# Patient Record
Sex: MALE | Race: WHITE | NOT HISPANIC OR LATINO | Employment: OTHER | URBAN - METROPOLITAN AREA
[De-identification: names, ages, dates, MRNs, and addresses within clinical notes are randomized per-mention and may not be internally consistent; named-entity substitution may affect disease eponyms.]

---

## 2021-09-17 PROCEDURE — 88305 TISSUE EXAM BY PATHOLOGIST: CPT | Mod: 26 | Performed by: DERMATOLOGY

## 2021-09-17 PROCEDURE — 88305 TISSUE EXAM BY PATHOLOGIST: CPT | Mod: TC,ORL | Performed by: DERMATOLOGY

## 2021-09-20 ENCOUNTER — LAB REQUISITION (OUTPATIENT)
Dept: LAB | Facility: CLINIC | Age: 71
End: 2021-09-20
Payer: MEDICARE

## 2021-09-22 LAB
PATH REPORT.COMMENTS IMP SPEC: NORMAL
PATH REPORT.COMMENTS IMP SPEC: NORMAL
PATH REPORT.FINAL DX SPEC: NORMAL
PATH REPORT.GROSS SPEC: NORMAL
PATH REPORT.MICROSCOPIC SPEC OTHER STN: NORMAL
PATH REPORT.RELEVANT HX SPEC: NORMAL

## 2023-08-14 ENCOUNTER — HOSPITAL ENCOUNTER (OUTPATIENT)
Facility: CLINIC | Age: 73
Setting detail: OBSERVATION
Discharge: HOME | End: 2023-08-15
Attending: EMERGENCY MEDICINE | Admitting: INTERNAL MEDICINE
Payer: MEDICARE

## 2023-08-14 DIAGNOSIS — I48.91 ATRIAL FIBRILLATION WITH RAPID VENTRICULAR RESPONSE (H): ICD-10-CM

## 2023-08-14 LAB
ALBUMIN SERPL BCG-MCNC: 4 G/DL (ref 3.5–5.2)
ALBUMIN UR-MCNC: NEGATIVE MG/DL
ALP SERPL-CCNC: 53 U/L (ref 40–129)
ALT SERPL W P-5'-P-CCNC: 16 U/L (ref 0–70)
ANION GAP SERPL CALCULATED.3IONS-SCNC: 11 MMOL/L (ref 7–15)
APPEARANCE UR: CLEAR
APTT PPP: 31 SECONDS (ref 22–38)
AST SERPL W P-5'-P-CCNC: 26 U/L (ref 0–45)
BILIRUB DIRECT SERPL-MCNC: NORMAL MG/DL
BILIRUB SERPL-MCNC: 1.2 MG/DL
BILIRUB UR QL STRIP: NEGATIVE
BUN SERPL-MCNC: 15.5 MG/DL (ref 8–23)
CALCIUM SERPL-MCNC: 8.8 MG/DL (ref 8.8–10.2)
CHLORIDE SERPL-SCNC: 106 MMOL/L (ref 98–107)
COLOR UR AUTO: YELLOW
CREAT SERPL-MCNC: 1.05 MG/DL (ref 0.67–1.17)
DEPRECATED HCO3 PLAS-SCNC: 24 MMOL/L (ref 22–29)
ERYTHROCYTE [DISTWIDTH] IN BLOOD BY AUTOMATED COUNT: 13 % (ref 10–15)
ERYTHROCYTE [DISTWIDTH] IN BLOOD BY AUTOMATED COUNT: 13.2 % (ref 10–15)
GFR SERPL CREATININE-BSD FRML MDRD: 74 ML/MIN/1.73M2
GLUCOSE SERPL-MCNC: 113 MG/DL (ref 70–99)
GLUCOSE UR STRIP-MCNC: NEGATIVE MG/DL
HBA1C MFR BLD: 5.8 %
HCT VFR BLD AUTO: 46.3 % (ref 40–53)
HCT VFR BLD AUTO: 48.9 % (ref 40–53)
HGB BLD-MCNC: 15.7 G/DL (ref 13.3–17.7)
HGB BLD-MCNC: 16.9 G/DL (ref 13.3–17.7)
HGB UR QL STRIP: NEGATIVE
HOLD SPECIMEN: NORMAL
INR PPP: 1.09 (ref 0.85–1.15)
KETONES UR STRIP-MCNC: NEGATIVE MG/DL
LEUKOCYTE ESTERASE UR QL STRIP: NEGATIVE
MAGNESIUM SERPL-MCNC: 1.8 MG/DL (ref 1.7–2.3)
MCH RBC QN AUTO: 32.8 PG (ref 26.5–33)
MCH RBC QN AUTO: 33.2 PG (ref 26.5–33)
MCHC RBC AUTO-ENTMCNC: 33.9 G/DL (ref 31.5–36.5)
MCHC RBC AUTO-ENTMCNC: 34.6 G/DL (ref 31.5–36.5)
MCV RBC AUTO: 96 FL (ref 78–100)
MCV RBC AUTO: 97 FL (ref 78–100)
MUCOUS THREADS #/AREA URNS LPF: PRESENT /LPF
NITRATE UR QL: NEGATIVE
PH UR STRIP: 6 [PH] (ref 5–7)
PLATELET # BLD AUTO: 211 10E3/UL (ref 150–450)
PLATELET # BLD AUTO: 216 10E3/UL (ref 150–450)
POTASSIUM SERPL-SCNC: 4.3 MMOL/L (ref 3.4–5.3)
PROT SERPL-MCNC: 6.6 G/DL (ref 6.4–8.3)
RBC # BLD AUTO: 4.78 10E6/UL (ref 4.4–5.9)
RBC # BLD AUTO: 5.09 10E6/UL (ref 4.4–5.9)
RBC URINE: 3 /HPF
SODIUM SERPL-SCNC: 141 MMOL/L (ref 136–145)
SP GR UR STRIP: 1.02 (ref 1–1.03)
TROPONIN T SERPL HS-MCNC: 34 NG/L
TROPONIN T SERPL HS-MCNC: 39 NG/L
TROPONIN T SERPL HS-MCNC: 49 NG/L
TSH SERPL DL<=0.005 MIU/L-ACNC: 2.27 UIU/ML (ref 0.3–4.2)
UFH PPP CHRO-ACNC: 0.13 IU/ML
UROBILINOGEN UR STRIP-MCNC: NORMAL MG/DL
WBC # BLD AUTO: 13.8 10E3/UL (ref 4–11)
WBC # BLD AUTO: 14.2 10E3/UL (ref 4–11)
WBC URINE: <1 /HPF

## 2023-08-14 PROCEDURE — 96365 THER/PROPH/DIAG IV INF INIT: CPT

## 2023-08-14 PROCEDURE — 99292 CRITICAL CARE ADDL 30 MIN: CPT

## 2023-08-14 PROCEDURE — 99291 CRITICAL CARE FIRST HOUR: CPT | Mod: 25

## 2023-08-14 PROCEDURE — 99222 1ST HOSP IP/OBS MODERATE 55: CPT | Performed by: INTERNAL MEDICINE

## 2023-08-14 PROCEDURE — 93005 ELECTROCARDIOGRAM TRACING: CPT

## 2023-08-14 PROCEDURE — 93005 ELECTROCARDIOGRAM TRACING: CPT | Mod: 76

## 2023-08-14 PROCEDURE — 85730 THROMBOPLASTIN TIME PARTIAL: CPT | Performed by: EMERGENCY MEDICINE

## 2023-08-14 PROCEDURE — 96376 TX/PRO/DX INJ SAME DRUG ADON: CPT

## 2023-08-14 PROCEDURE — 120N000001 HC R&B MED SURG/OB

## 2023-08-14 PROCEDURE — 96375 TX/PRO/DX INJ NEW DRUG ADDON: CPT

## 2023-08-14 PROCEDURE — 99223 1ST HOSP IP/OBS HIGH 75: CPT | Mod: AI | Performed by: HOSPITALIST

## 2023-08-14 PROCEDURE — 82310 ASSAY OF CALCIUM: CPT | Performed by: EMERGENCY MEDICINE

## 2023-08-14 PROCEDURE — 36415 COLL VENOUS BLD VENIPUNCTURE: CPT | Performed by: HOSPITALIST

## 2023-08-14 PROCEDURE — 85027 COMPLETE CBC AUTOMATED: CPT | Performed by: HOSPITALIST

## 2023-08-14 PROCEDURE — 36415 COLL VENOUS BLD VENIPUNCTURE: CPT | Performed by: EMERGENCY MEDICINE

## 2023-08-14 PROCEDURE — 81001 URINALYSIS AUTO W/SCOPE: CPT | Performed by: EMERGENCY MEDICINE

## 2023-08-14 PROCEDURE — 85610 PROTHROMBIN TIME: CPT | Performed by: EMERGENCY MEDICINE

## 2023-08-14 PROCEDURE — 85027 COMPLETE CBC AUTOMATED: CPT | Performed by: EMERGENCY MEDICINE

## 2023-08-14 PROCEDURE — 250N000011 HC RX IP 250 OP 636: Mod: JZ | Performed by: HOSPITALIST

## 2023-08-14 PROCEDURE — 84484 ASSAY OF TROPONIN QUANT: CPT | Performed by: HOSPITALIST

## 2023-08-14 PROCEDURE — 250N000013 HC RX MED GY IP 250 OP 250 PS 637: Performed by: INTERNAL MEDICINE

## 2023-08-14 PROCEDURE — 84443 ASSAY THYROID STIM HORMONE: CPT | Performed by: EMERGENCY MEDICINE

## 2023-08-14 PROCEDURE — 250N000011 HC RX IP 250 OP 636: Performed by: EMERGENCY MEDICINE

## 2023-08-14 PROCEDURE — 83735 ASSAY OF MAGNESIUM: CPT | Performed by: EMERGENCY MEDICINE

## 2023-08-14 PROCEDURE — 96366 THER/PROPH/DIAG IV INF ADDON: CPT

## 2023-08-14 PROCEDURE — 83036 HEMOGLOBIN GLYCOSYLATED A1C: CPT | Performed by: HOSPITALIST

## 2023-08-14 PROCEDURE — 85520 HEPARIN ASSAY: CPT | Performed by: HOSPITALIST

## 2023-08-14 PROCEDURE — 84484 ASSAY OF TROPONIN QUANT: CPT | Performed by: EMERGENCY MEDICINE

## 2023-08-14 RX ORDER — METOPROLOL TARTRATE 25 MG/1
25 TABLET, FILM COATED ORAL 2 TIMES DAILY
Status: DISCONTINUED | OUTPATIENT
Start: 2023-08-15 | End: 2023-08-15 | Stop reason: HOSPADM

## 2023-08-14 RX ORDER — ACETAMINOPHEN 325 MG/1
650 TABLET ORAL EVERY 6 HOURS PRN
Status: DISCONTINUED | OUTPATIENT
Start: 2023-08-14 | End: 2023-08-15 | Stop reason: HOSPADM

## 2023-08-14 RX ORDER — ONDANSETRON 2 MG/ML
4 INJECTION INTRAMUSCULAR; INTRAVENOUS EVERY 6 HOURS PRN
Status: DISCONTINUED | OUTPATIENT
Start: 2023-08-14 | End: 2023-08-15 | Stop reason: HOSPADM

## 2023-08-14 RX ORDER — METOPROLOL TARTRATE 50 MG
50 TABLET ORAL 2 TIMES DAILY
Status: DISCONTINUED | OUTPATIENT
Start: 2023-08-14 | End: 2023-08-14

## 2023-08-14 RX ORDER — DILTIAZEM HCL/D5W 125 MG/125
5-15 PLASTIC BAG, INJECTION (ML) INTRAVENOUS CONTINUOUS
Status: DISCONTINUED | OUTPATIENT
Start: 2023-08-14 | End: 2023-08-14

## 2023-08-14 RX ORDER — ACETAMINOPHEN 650 MG/1
650 SUPPOSITORY RECTAL EVERY 6 HOURS PRN
Status: DISCONTINUED | OUTPATIENT
Start: 2023-08-14 | End: 2023-08-15 | Stop reason: HOSPADM

## 2023-08-14 RX ORDER — ONDANSETRON 4 MG/1
4 TABLET, ORALLY DISINTEGRATING ORAL EVERY 6 HOURS PRN
Status: DISCONTINUED | OUTPATIENT
Start: 2023-08-14 | End: 2023-08-15 | Stop reason: HOSPADM

## 2023-08-14 RX ORDER — HEPARIN SODIUM 10000 [USP'U]/100ML
0-5000 INJECTION, SOLUTION INTRAVENOUS CONTINUOUS
Status: DISCONTINUED | OUTPATIENT
Start: 2023-08-14 | End: 2023-08-14

## 2023-08-14 RX ORDER — LIDOCAINE 40 MG/G
CREAM TOPICAL
Status: DISCONTINUED | OUTPATIENT
Start: 2023-08-14 | End: 2023-08-15 | Stop reason: HOSPADM

## 2023-08-14 RX ORDER — NITROGLYCERIN 0.4 MG/1
0.4 TABLET SUBLINGUAL EVERY 5 MIN PRN
Status: DISCONTINUED | OUTPATIENT
Start: 2023-08-14 | End: 2023-08-15 | Stop reason: HOSPADM

## 2023-08-14 RX ORDER — DILTIAZEM HYDROCHLORIDE 5 MG/ML
20 INJECTION INTRAVENOUS ONCE
Status: COMPLETED | OUTPATIENT
Start: 2023-08-14 | End: 2023-08-14

## 2023-08-14 RX ORDER — HEPARIN SODIUM 10000 [USP'U]/100ML
0-5000 INJECTION, SOLUTION INTRAVENOUS CONTINUOUS
Status: DISCONTINUED | OUTPATIENT
Start: 2023-08-14 | End: 2023-08-15

## 2023-08-14 RX ADMIN — HEPARIN SODIUM 1200 UNITS/HR: 10000 INJECTION, SOLUTION INTRAVENOUS at 14:15

## 2023-08-14 RX ADMIN — METOPROLOL TARTRATE 50 MG: 50 TABLET, FILM COATED ORAL at 20:21

## 2023-08-14 RX ADMIN — HEPARIN SODIUM 1200 UNITS/HR: 10000 INJECTION, SOLUTION INTRAVENOUS at 18:26

## 2023-08-14 RX ADMIN — DILTIAZEM HYDROCHLORIDE 20 MG: 5 INJECTION INTRAVENOUS at 13:11

## 2023-08-14 RX ADMIN — Medication 5 MG/HR: at 13:57

## 2023-08-14 ASSESSMENT — ACTIVITIES OF DAILY LIVING (ADL)
ADLS_ACUITY_SCORE: 35
ADLS_ACUITY_SCORE: 35
ADLS_ACUITY_SCORE: 20

## 2023-08-14 NOTE — ED TRIAGE NOTES
Pt arrives via EMS from [facility] d/t afib RVR. Pt went to [facility] for dyspnea since Sat. Denies hx afib. HR ranging 150-160s, 140-160 SBP. 324 mg ASA and 1 nitroglycerin which took away mild CP. Lungs clear. ABC intact.     Of note, Pt had bee sting on Friday and took OCT meds to help with itching.

## 2023-08-14 NOTE — PHARMACY-ADMISSION MEDICATION HISTORY
Pharmacist Admission Medication History    Admission medication history is complete. The information provided in this note is only as accurate as the sources available at the time of the update.    Medication reconciliation/reorder completed by provider prior to medication history? No    Information Source(s): Patient via in-person    Changes made to PTA medication list:  Added: None  Deleted: None  Changed: None    Medication Affordability:       Allergies reviewed with patient and updates made in EHR: yes    Medication History Completed By: Mahendra De La O RPH 8/14/2023 1:30 PM    Prior to Admission medications    Not on File

## 2023-08-14 NOTE — H&P
[facility]    History and Physical - Hospitalist Service       Date of Admission:  8/14/2023    Assessment & Plan    Ryan Metcalf is a 73 year old male w/PMH basal cell carcinoma, impaired fasting glucose who presents from home with palpitations and found to be in afib with RVR    Afib with RVR  palpitations   -presents with palpitations since Saturday with mild dyspnea, evaluated at urgent care and noted to be in afib with 's. Transferred to ED and started on cardizem and heparin drip and has now flipped back to NSR and symptoms have resolved.  -TSH, K+ WNL. Mag 1.8, Cr ok  -start low dose metoprolol with hold parameters as HR now in low 60's, wean off cardizem drip  -cont heparin drip but if no need for cardioversion may not even need on discharge as his CHADSVASC2 is only 1 point   -check ECHO, monitor on tele and consult cardiology  -some reports of snoring, could consider outpt sleep study    Elevated troponin  -minimally elevated at 34 due to afib, no evidence of ACS   -trend trops, monitor on tele    Recent bee sting w/allergic response  Leukocytosis  -bee sting last Thursday, hives now resolving and asymptomatic  -mild leukocytosis, likely stress response. Monitor    Hx basal cell carcinoma, impaired fasting glucose, alcohol use  -drinks appx 1 drink daily, doubt contributing to above  -check A1C    Diet:  cardiac diet  DVT Prophylaxis: heparin drip  Rollins Catheter: Not present  Lines: None     Cardiac Monitoring: None  Code Status:  full code      Disposition Plan   Likely tomorrow          Josiah Jhaveri DO  Hospitalist Service  [facility]  Securely message with [employer] (more info)  Text page via [employer] Paging/Directory     ______________________________________________________________________    Chief Complaint   palpitations    History of Present Illness   Ryan Metcalf is a 73 year old male w/PMH basal cell carcinoma, impaired fasting glucose who  presents from home with palpitations. He notes being stung by a bee last Thursday with resultant severe hives over chest, abdomen and into his groin and back. Took Allegra and this seemed to resolve without incident. However starting Saturday started to develop some occasional palpitations with mild weakness and occasional difficulty with deep breathing. Denies any sob, CP, fever, chills or other complaints. Symptoms were persistent and came back gain today so went to urgent care and was found to have HR in the 150's and was transferred to ED.    In ED was started on cardizem drip and appears he has converted to NSR. Was placed on heparin drip as well. Denies any hx of cardiac issues, never had a stress test or ECHO. TSH and electrolytes ok, troponin minimally elevated at 36. EKG with afb. HR currently 60's and no further palpitations.    Past Medical History    Basal cell carcinoma  Impaired fasting glucose    Past Surgical History   L knee arthroscopy    Prior to Admission Medications   None        Review of Systems    The 10 point Review of Systems is negative other than noted in the HPI or here.    Social History   Drinks appx 1 alcoholic beverage every 24-48 hours  Denies tobacco use     Family History     No significant family history reported to me      Allergies   No Known Allergies     Physical Exam   Vital Signs: Temp: 98.3  F (36.8  C) Temp src: Temporal BP: 110/75 Pulse: 65   Resp: 20 SpO2: 98 % O2 Device: None (Room air)    Weight: 219 lbs 5.72 oz    Constitutional: awake, alert, and cooperative  Eyes: pupils equal, round and reactive to light and conjunctiva normal  ENT: normocepalic, without obvious abnormality, atramatic  Respiratory: no increased work of breathing, good air exchange, and clear to auscultation  Cardiovascular: regular rate and rhythm, no murmur noted, and no edema  GI: normal bowel sounds, soft, and non-distended  Skin: no bruising or bleeding, mild resolving erythema to L anterior  chest wall  Neurologic: alert, oriented, no focal deficits    45 MINUTES SPENT BY ME on the date of service doing chart review, history, exam, documentation & further activities per the note.      Data   ------------------------- PAST 24 HR DATA REVIEWED -----------------------------------------------    I have personally reviewed the following data over the past 24 hrs:    14.2 (H)  \   15.7   / 216     141 106 15.5 /  113 (H)   4.3 24 1.05 \     ALT: 16 AST: 26 AP: 53 TBILI: 1.2   ALB: 4.0 TOT PROTEIN: 6.6 LIPASE: N/A     Trop: 34 (H) BNP: N/A     TSH: 2.27 T4: N/A A1C: N/A     INR:  1.09 PTT:  31   D-dimer:  N/A Fibrinogen:  N/A       Imaging results reviewed over the past 24 hrs:   No results found for this or any previous visit (from the past 24 hour(s)).

## 2023-08-14 NOTE — ED PROVIDER NOTES
History     Chief Complaint:  Tachycardia       HPI   Ryan Metcalf is a 73 year old male who presents with palpitations. The patient reports an onset of palpitations and shortness of breath with exertion two days ago that later resolved. He began having palpitations again yesterday evening, and noted feeling short of breath with exertion again along with chest pressure with deep breathing. He has never had this before and had been feeling at baseline prior besides being stung by a bee a 3 days ago and receiving Allegra. No recent fever, cough, or congestion. No unexpected weight loss or recent bleeding issues. He presents here in atrial fibrillation with RVR from [facility].     Independent Historian:   The patient's wife is present and provides additional history in regards to the progression of his symptoms over the last 3 to 4 days as well as the bee sting over the chest.    History taken from EMS.  He received aspirin and nitroglycerin prehospital.    Review of External Notes:   Office visit reviewed from earlier today when the patient was found to be in atrial fibrillation.      Medications:    None     Past Medical History:    Tubular adenoma of colon  Melanoma of skin    Past Surgical History:    Left knee arthroplasty    Tonsillectomy  Melanoma excision  Bilateral blepharoplasty   Colonoscopy with snare polypectomy   Back surgery     Physical Exam   Patient Vitals for the past 24 hrs:   BP Temp Temp src Pulse Resp SpO2 Weight   08/14/23 1418 -- -- -- 65 20 98 % --   08/14/23 1415 110/75 -- -- 65 16 98 % --   08/14/23 1411 -- -- -- 64 20 97 % --   08/14/23 1406 -- -- -- 61 17 97 % --   08/14/23 1405 -- -- -- (!) 158 21 99 % --   08/14/23 1403 -- -- -- 86 18 97 % --   08/14/23 1402 108/82 -- -- (!) 123 21 97 % --   08/14/23 1352 (!) 113/96 -- -- (!) 129 12 100 % --   08/14/23 1347 -- -- -- (!) 149 17 100 % --   08/14/23 1337 99/72 -- -- 66 23 98 % --   08/14/23 1325 -- -- -- (!) 125 30 100  % --   08/14/23 1320 -- -- -- 89 -- -- --   08/14/23 1315 -- -- -- 98 17 99 % --   08/14/23 1310 96/72 -- -- (!) 181 22 100 % --   08/14/23 1302 -- -- -- (!) 170 20 100 % --   08/14/23 1300 111/76 -- -- (!) 181 17 100 % --   08/14/23 1247 99/85 -- -- (!) 176 (!) 8 100 % --   08/14/23 1240 -- -- -- (!) 142 19 100 % --   08/14/23 1232 (!) 104/95 -- -- (!) 146 14 100 % --   08/14/23 1230 -- 98.3  F (36.8  C) Temporal (!) 168 -- -- --   08/14/23 1229 -- -- -- -- -- -- 99.5 kg (219 lb 5.7 oz)   08/14/23 1226 (!) 114/94 -- Temporal -- 20 100 % --        Physical Exam  Constitutional:       General: He is not in acute distress.     Appearance: Normal appearance. He is not toxic-appearing.   HENT:      Head: Atraumatic.      Right Ear: External ear normal.      Left Ear: External ear normal.      Nose: Nose normal.      Mouth/Throat:      Pharynx: Oropharynx is clear.   Eyes:      General: No scleral icterus.     Extraocular Movements: Extraocular movements intact.      Conjunctiva/sclera: Conjunctivae normal.      Pupils: Pupils are equal, round, and reactive to light.   Cardiovascular:      Rate and Rhythm: Tachycardia present. Rhythm irregular.      Heart sounds: Normal heart sounds.   Pulmonary:      Effort: Pulmonary effort is normal. No respiratory distress.      Breath sounds: Normal breath sounds.   Abdominal:      Palpations: Abdomen is soft.      Tenderness: There is no abdominal tenderness.   Musculoskeletal:         General: No deformity.      Cervical back: Neck supple.      Right lower leg: No edema.      Left lower leg: No edema.   Skin:     General: Skin is warm.      Capillary Refill: Capillary refill takes less than 2 seconds.      Comments: No rash over the chest.   Neurological:      General: No focal deficit present.      Mental Status: He is alert and oriented to person, place, and time.   Psychiatric:         Mood and Affect: Mood normal.         Behavior: Behavior normal.           Emergency  Department Course   ECG 1  ECG taken at 1225, ECG read at 1233  Atrial fibrillation with RVR; ST&T wave abnormality, consider lateral ischemia   No prior ECG for comparison.  Rate 171 bpm. WY interval * ms. QRS duration 84 ms. QT/QTc 282/475 ms. P-R-T axes * -20 185.     ECG 2  ECG taken at 1416, ECG read at 1420  Normal sinus rhythm; nonspecific ST abnormality   Sinus rhythm replaces atrial fibrillation as compared to prior today.  Rate 6 bpm. WY interval 130 ms. QRS duration 94 ms. QT/QTc 382/400 ms. P-R-T axes 35 -18 16.      Laboratory:  Labs Ordered and Resulted from Time of ED Arrival to Time of ED Departure   TROPONIN T, HIGH SENSITIVITY - Abnormal       Result Value    Troponin T, High Sensitivity 34 (*)    CBC WITH PLATELETS - Abnormal    WBC Count 14.2 (*)     RBC Count 4.78      Hemoglobin 15.7      Hematocrit 46.3      MCV 97      MCH 32.8      MCHC 33.9      RDW 13.0      Platelet Count 216     BASIC METABOLIC PANEL - Abnormal    Sodium 141      Potassium 4.3      Chloride 106      Carbon Dioxide (CO2) 24      Anion Gap 11      Urea Nitrogen 15.5      Creatinine 1.05      Calcium 8.8      Glucose 113 (*)     GFR Estimate 74     INR - Normal    INR 1.09     PARTIAL THROMBOPLASTIN TIME - Normal    aPTT 31     MAGNESIUM - Normal    Magnesium 1.8     TSH WITH FREE T4 REFLEX - Normal    TSH 2.27     HEPATIC FUNCTION PANEL    Protein Total 6.6      Albumin 4.0      Bilirubin Total 1.2      Alkaline Phosphatase 53      AST 26      ALT 16      Bilirubin Direct       ROUTINE UA WITH MICROSCOPIC REFLEX TO CULTURE      Emergency Department Course & Assessments:  Interventions:  Medications   diltiazem (CARDIZEM) 125 mg in dextrose 5 % 125 mL infusion (0 mg/hr Intravenous Stopped 8/14/23 1418)   heparin 25,000 units in 0.45% NaCl 250 mL ANTICOAGULANT infusion (0 Units/hr Intravenous Stopped 8/14/23 1422)   diltiazem (CARDIZEM) injection 20 mg (20 mg Intravenous $Given 8/14/23 1311)   heparin loading dose for LOW  INTENSITY TREATMENT * Give BEFORE starting heparin infusion (5,950 Units Intravenous $Given 8/14/23 1414)      Assessments:  1225 I obtained history and examined the patient as noted above.     Consultations/Discussion of Management or Tests:  1416 I spoke with Priti from hospitalist service regarding the patient's presentation and workup. Care of patient accepted on behalf of Dr. Jhaveri.    Disposition:  The patient was admitted to the hospital under the care of Dr. Jhaveri.     Impression & Plan      Medical Decision Making:  This patient is a 73-year-old man who presents with new onset rapid atrial fibrillation.  He is otherwise generally healthy.  He received diltiazem bolus.  He had some improvement in his rate but continued in A-fib.  He was started on a diltiazem drip.  Cardioversion at this point has been deferred given that his symptoms have been off and on for over 2 days.  He was started on a heparin drip as well.    The patient has had very close frequent repeat evaluations.  He is being admitted to the hospital service for further work-up.  He did convert spontaneously to normal sinus rhythm.  He feels improved overall.    Critical Care time was 45 minutes for this patient excluding procedures.     Diagnosis:    ICD-10-CM    1. Atrial fibrillation with rapid ventricular response (H)  I48.91            Scribe Disclosure:  I, Roula Bruno, am serving as a scribe at 12:33 PM on 8/14/2023 to document services personally performed by Gopi Viera MD based on my observations and the provider's statements to me.     8/14/2023   Gopi Viera MD McRoberts, Sean Edward, MD  08/14/23 0246

## 2023-08-14 NOTE — CONSULTS
[facility]    Cardiology Consultation    Assessment & Plan   Ryan Metcalf is a 73 year old male who was admitted on 8/14/2023.     1.  Newly diagnosed Paroxysmal Atrial Fibrillation CHADSVASC = 1 based upon age 73  2.  Sp  TKR    He will require no anticoagulation at this time given CHADSVaSc 1. We will plan  to screen for cardiac structural abnormalities with TTE and exclude hyperthyroidism.  Beta blocker therapy is a safe and oftentimes effective means to  reduce symptomatic palpitations. Limiting alcohol, weight loss and treatment  of  sleep apnea are  nonpharmacologic lifestyle interventions that reduce episodes of paroxysmal  atrial fibrillation    Plan  Agree on holding anticoagulation for now unless LV dysfunction on TTE  Increase metoprolol to 50 bid  TTE  Mediterranean style weight loss diet  Limit alcohol consumption  Screen for hyperthyroidism /sleep apnea      Ponce Landrum MD, MD    History of present illness.     Ryan Metcalf, a 73-year-old man with a history of osteoarthritis and (status post left total knee replacement ), back surgery, and melanoma ( sp excision) was evaluated in consultation request of  for newly diagnosed paroxysmal atrial fibrillation    Since Saturday evening, 2 days prior to admission, the patient's noted intermittent palpitations along with mild dyspnea.  He denies chest arm neck jaw or back discomfort, structural heart disease, diabetes mellitus, hypertension, vascular disease, hypothyroidism, or known valve disease.  He and his wife usually winter in [place], and are planning to move to [facility].  He drinks about 1 alcoholic drink per day and denies any history of alcohol abuse.  Because of prolonged palpitations this morning, he came to emergency room where he underwent an ECG confirming atrial fibrillation with a rapid ventricular response rate.  The patient was placed on intervenous diltiazem, but converted back to a sinus  rhythm and has been switched to oral metoprolol 12.5 twice daily by his managing doctors.  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[employer]  bikes or walks daily  [place] in [place]    plans  a move to [facility], leaving this weekend        Review of systems  A 12 point review of systems was performed outside the issues mentioned HPI there were no other complaint      Physical Exam   Temp: 98.5  F (36.9  C) Temp src: Oral BP: 127/80 Pulse: 71   Resp: 18 SpO2: 95 % O2 Device: None (Room air)    Vitals:    08/14/23 1229 08/14/23 1626   Weight: 99.5 kg (219 lb 5.7 oz) 97.7 kg (215 lb 6.4 oz)     Vital Signs with Ranges  Temp:  [98.3  F (36.8  C)-98.5  F (36.9  C)] 98.5  F (36.9  C)  Pulse:  [] 71  Resp:  [8-30] 18  BP: ()/(70-96) 127/80  SpO2:  [95 %-100 %] 95 %  No intake/output data recorded.    Constitutional: Pleasant, intelligent awake, alert, cooperative, no apparent distress  HEENT: Pupils equal round reactive to light as normal size JVD 6 cm  Lungs: Clear to auscultation bilaterally, no crackles or wheezing  Cardiovascular: Regular rate and rhythm, normal S1 and S2, and no murmur noted  Pulses: Carotid, radial, brachial, femoral, popliteal, dorsalis pedis, posterior tibial pulses all normal and symmetrical no bruits  Abdomen: Normal bowel sounds, soft, non-distended, non-tender moderate obesity no mass or tenderness  Skin: No rashes, no cyanosis, no edema   Neurologic : Cranial nerves II through XII are intact strength equal symmetrical displays normal insight and judgment      Medications    dilTIAZem Stopped (08/14/23 1418)    heparin      Reason anticoagulant  not prescribed for atrial fibrillation      - MEDICATION INSTRUCTIONS -        metoprolol tartrate  12.5 mg Oral BID    sodium chloride (PF)  3 mL Intracatheter Q8H    sodium chloride (PF)  3 mL Intracatheter Q8H       Data   I personally reviewed  admission the EKG tracing showing Atrial fibrillation NSST changes rate 171    .  Repeat ECG shows NSR normal axis and normal intervals  Recent Labs   Lab 08/14/23  1651 08/14/23  1230   WBC 13.8* 14.2*   HGB 16.9 15.7   MCV 96 97    216   INR  --  1.09   NA  --  141   POTASSIUM  --  4.3   CHLORIDE  --  106   CO2  --  24   BUN  --  15.5   CR  --  1.05   ANIONGAP  --  11     No results found for this or any previous visit (from the past 24 hour(s)).

## 2023-08-14 NOTE — PLAN OF CARE
"/80 (BP Location: Right arm)   Pulse 71   Temp 98.5  F (36.9  C) (Oral)   Resp 18   Ht 1.778 m (5' 10\")   Wt 97.7 kg (215 lb 6.4 oz)   SpO2 95%   BMI 30.91 kg/m      Neuro: Alert and Oriented x4  Cardiac: Tele SR  Lungs: WDL  GI: WDL  : WDL  Pain: Denies  IV: Saline lock right hand, Heparin infusing @1200  Meds: Heparin  Labs/tests: UA to be collected  Diet: Cardiac Diet  Activity: SBA  Misc: I/Os, Cardialogy consult  Plan: Currently resting in bed, able to make need known.    "

## 2023-08-14 NOTE — ED NOTES
[facility]  ED Nurse Handoff Report    ED Chief complaint: Tachycardia  . ED Diagnosis:   Final diagnoses:   Atrial fibrillation with rapid ventricular response (H)       Allergies: No Known Allergies    Code Status: Full Code    Activity level - Baseline/Home:  independent.  Activity Level - Current:   standby.   Lift room needed: No.   Bariatric: No   Needed: No   Isolation: No.   Infection: Not Applicable.     Respiratory status: Room air    Vital Signs (within 30 minutes):   Vitals:    08/14/23 1449 08/14/23 1504 08/14/23 1515 08/14/23 1519   BP: 120/74 119/77 120/75    Pulse: 70 66 73    Resp: 19  20    Temp:       TempSrc:       SpO2: 97% 98% 97% 96%   Weight:           Cardiac Rhythm:  ,   Cardiac  Cardiac Rhythm: Atrial fibrillation (initally afib RVR, after 20 mg diltiazem went to afib 90s. Denies CP.)  Pain level:    Patient confused: No.   Patient Falls Risk: nonskid shoes/slippers when out of bed, patient and family education, assistive device/personal items within reach, and mobility aid in reach.   Elimination Status: Has voided     Patient Report - Initial Complaint: dyspnea.   Focused Assessment:  Ryan Metcalf is a 73 year old male who presents with palpitations. The patient reports an onset of palpitations and shortness of breath with exertion two days ago that later resolved. He began having palpitations again yesterday evening, and noted feeling short of breath with exertion again along with chest pressure with deep breathing. He has never had this before and had been feeling at baseline prior besides being stung by a bee a 3 days ago and receiving Allegra. No recent fever, cough, or congestion. No unexpected weight loss or recent bleeding issues. He presents here in atrial fibrillation with RVR from [facility].      Abnormal Results:   Labs Ordered and Resulted from Time of ED Arrival to Time of ED Departure   TROPONIN T, HIGH SENSITIVITY -  Abnormal       Result Value    Troponin T, High Sensitivity 34 (*)    CBC WITH PLATELETS - Abnormal    WBC Count 14.2 (*)     RBC Count 4.78      Hemoglobin 15.7      Hematocrit 46.3      MCV 97      MCH 32.8      MCHC 33.9      RDW 13.0      Platelet Count 216     BASIC METABOLIC PANEL - Abnormal    Sodium 141      Potassium 4.3      Chloride 106      Carbon Dioxide (CO2) 24      Anion Gap 11      Urea Nitrogen 15.5      Creatinine 1.05      Calcium 8.8      Glucose 113 (*)     GFR Estimate 74     INR - Normal    INR 1.09     PARTIAL THROMBOPLASTIN TIME - Normal    aPTT 31     MAGNESIUM - Normal    Magnesium 1.8     TSH WITH FREE T4 REFLEX - Normal    TSH 2.27     HEPATIC FUNCTION PANEL    Protein Total 6.6      Albumin 4.0      Bilirubin Total 1.2      Alkaline Phosphatase 53      AST 26      ALT 16      Bilirubin Direct       ROUTINE UA WITH MICROSCOPIC REFLEX TO CULTURE        No orders to display       Treatments provided: see MAR  Family Comments: wife at bedside  OBS brochure/video discussed/provided to patient:  N/A  ED Medications:   Medications   diltiazem (CARDIZEM) 125 mg in dextrose 5 % 125 mL infusion (0 mg/hr Intravenous Stopped 8/14/23 1418)   heparin 25,000 units in 0.45% NaCl 250 mL ANTICOAGULANT infusion (0 Units/hr Intravenous Stopped 8/14/23 1422)   diltiazem (CARDIZEM) injection 20 mg (20 mg Intravenous $Given 8/14/23 1311)   heparin loading dose for LOW INTENSITY TREATMENT * Give BEFORE starting heparin infusion (5,950 Units Intravenous $Given 8/14/23 1414)       Drips infusing:  No  For the majority of the shift this patient was Green.   Interventions performed were n/a.    Sepsis treatment initiated: No    Cares/treatment/interventions/medications to be completed following ED care: n/a    ED Nurse Name: Dejon Loredo RN  3:20 PM  RECEIVING UNIT ED HANDOFF REVIEW    Above ED Nurse Handoff Report was reviewed: Yes  Reviewed by: Louis Briseno RN on August 14, 2023 at 3:59 PM

## 2023-08-15 ENCOUNTER — APPOINTMENT (OUTPATIENT)
Dept: CARDIOLOGY | Facility: CLINIC | Age: 73
End: 2023-08-15
Attending: HOSPITALIST
Payer: MEDICARE

## 2023-08-15 VITALS
OXYGEN SATURATION: 98 % | TEMPERATURE: 97.5 F | HEART RATE: 56 BPM | DIASTOLIC BLOOD PRESSURE: 73 MMHG | RESPIRATION RATE: 18 BRPM | SYSTOLIC BLOOD PRESSURE: 113 MMHG | BODY MASS INDEX: 30.65 KG/M2 | HEIGHT: 70 IN | WEIGHT: 214.1 LBS

## 2023-08-15 LAB
ANION GAP SERPL CALCULATED.3IONS-SCNC: 7 MMOL/L (ref 7–15)
ATRIAL RATE - MUSE: 66 BPM
ATRIAL RATE - MUSE: NORMAL BPM
BUN SERPL-MCNC: 14.6 MG/DL (ref 8–23)
CALCIUM SERPL-MCNC: 8.7 MG/DL (ref 8.8–10.2)
CHLORIDE SERPL-SCNC: 102 MMOL/L (ref 98–107)
CREAT SERPL-MCNC: 1.1 MG/DL (ref 0.67–1.17)
DEPRECATED HCO3 PLAS-SCNC: 26 MMOL/L (ref 22–29)
DIASTOLIC BLOOD PRESSURE - MUSE: NORMAL MMHG
DIASTOLIC BLOOD PRESSURE - MUSE: NORMAL MMHG
ERYTHROCYTE [DISTWIDTH] IN BLOOD BY AUTOMATED COUNT: 13 % (ref 10–15)
GFR SERPL CREATININE-BSD FRML MDRD: 70 ML/MIN/1.73M2
GLUCOSE SERPL-MCNC: 145 MG/DL (ref 70–99)
HCT VFR BLD AUTO: 42.5 % (ref 40–53)
HGB BLD-MCNC: 14.6 G/DL (ref 13.3–17.7)
INTERPRETATION ECG - MUSE: NORMAL
INTERPRETATION ECG - MUSE: NORMAL
LVEF ECHO: NORMAL
MCH RBC QN AUTO: 32.7 PG (ref 26.5–33)
MCHC RBC AUTO-ENTMCNC: 34.4 G/DL (ref 31.5–36.5)
MCV RBC AUTO: 95 FL (ref 78–100)
P AXIS - MUSE: 35 DEGREES
P AXIS - MUSE: NORMAL DEGREES
PLATELET # BLD AUTO: 185 10E3/UL (ref 150–450)
POTASSIUM SERPL-SCNC: 4.3 MMOL/L (ref 3.4–5.3)
PR INTERVAL - MUSE: 130 MS
PR INTERVAL - MUSE: NORMAL MS
QRS DURATION - MUSE: 84 MS
QRS DURATION - MUSE: 94 MS
QT - MUSE: 282 MS
QT - MUSE: 382 MS
QTC - MUSE: 400 MS
QTC - MUSE: 475 MS
R AXIS - MUSE: -18 DEGREES
R AXIS - MUSE: -20 DEGREES
RBC # BLD AUTO: 4.46 10E6/UL (ref 4.4–5.9)
SODIUM SERPL-SCNC: 135 MMOL/L (ref 136–145)
SYSTOLIC BLOOD PRESSURE - MUSE: NORMAL MMHG
SYSTOLIC BLOOD PRESSURE - MUSE: NORMAL MMHG
T AXIS - MUSE: 16 DEGREES
T AXIS - MUSE: 185 DEGREES
UFH PPP CHRO-ACNC: 0.68 IU/ML
VENTRICULAR RATE- MUSE: 171 BPM
VENTRICULAR RATE- MUSE: 66 BPM
WBC # BLD AUTO: 12.5 10E3/UL (ref 4–11)

## 2023-08-15 PROCEDURE — 85027 COMPLETE CBC AUTOMATED: CPT | Performed by: HOSPITALIST

## 2023-08-15 PROCEDURE — 80048 BASIC METABOLIC PNL TOTAL CA: CPT | Performed by: HOSPITALIST

## 2023-08-15 PROCEDURE — 96366 THER/PROPH/DIAG IV INF ADDON: CPT

## 2023-08-15 PROCEDURE — G0378 HOSPITAL OBSERVATION PER HR: HCPCS

## 2023-08-15 PROCEDURE — 85520 HEPARIN ASSAY: CPT | Performed by: HOSPITALIST

## 2023-08-15 PROCEDURE — 93306 TTE W/DOPPLER COMPLETE: CPT | Mod: 26 | Performed by: INTERNAL MEDICINE

## 2023-08-15 PROCEDURE — 255N000002 HC RX 255 OP 636: Performed by: HOSPITALIST

## 2023-08-15 PROCEDURE — 250N000013 HC RX MED GY IP 250 OP 250 PS 637: Performed by: INTERNAL MEDICINE

## 2023-08-15 PROCEDURE — 99231 SBSQ HOSP IP/OBS SF/LOW 25: CPT | Mod: 25 | Performed by: INTERNAL MEDICINE

## 2023-08-15 PROCEDURE — 99238 HOSP IP/OBS DSCHRG MGMT 30/<: CPT | Performed by: INTERNAL MEDICINE

## 2023-08-15 PROCEDURE — 250N000013 HC RX MED GY IP 250 OP 250 PS 637: Performed by: HOSPITALIST

## 2023-08-15 PROCEDURE — 96376 TX/PRO/DX INJ SAME DRUG ADON: CPT

## 2023-08-15 PROCEDURE — 36415 COLL VENOUS BLD VENIPUNCTURE: CPT | Performed by: HOSPITALIST

## 2023-08-15 PROCEDURE — 999N000208 ECHOCARDIOGRAM COMPLETE

## 2023-08-15 RX ORDER — METOPROLOL TARTRATE 25 MG/1
25 TABLET, FILM COATED ORAL 2 TIMES DAILY
Qty: 60 TABLET | Refills: 1 | Status: SHIPPED | OUTPATIENT
Start: 2023-08-15

## 2023-08-15 RX ADMIN — METOPROLOL TARTRATE 25 MG: 25 TABLET, FILM COATED ORAL at 09:25

## 2023-08-15 RX ADMIN — Medication 1 MG: at 00:55

## 2023-08-15 RX ADMIN — HUMAN ALBUMIN MICROSPHERES AND PERFLUTREN 3 ML: 10; .22 INJECTION, SOLUTION INTRAVENOUS at 13:52

## 2023-08-15 ASSESSMENT — ACTIVITIES OF DAILY LIVING (ADL)
ADLS_ACUITY_SCORE: 21
ADLS_ACUITY_SCORE: 20
ADLS_ACUITY_SCORE: 21
ADLS_ACUITY_SCORE: 20
ADLS_ACUITY_SCORE: 20
ADLS_ACUITY_SCORE: 21
ADLS_ACUITY_SCORE: 21
ADLS_ACUITY_SCORE: 20

## 2023-08-15 NOTE — PLAN OF CARE
Goal Outcome Evaluation:      VSS prior to discharge. AVS discussed w/ pt, wife. Questions encouraged and answered. Discharge meds filled at hospital sent with patient.

## 2023-08-15 NOTE — PROGRESS NOTES
[facility]    Cardiology Progress Note    Date of Admission: 08/14/2023  Service Date: 08/15/2023    Summary:  Mr. Ryan Metcalf is a very pleasant 73 year old male with a past medical history of basal cell carcinoma and prediabetes who was admitted on 8/14/2023 after presenting with with palpitations and found to be in atrial fibrillation with RVR which is a new diagnosis for him. The patient was placed on intervenous diltiazem, but converted back to a sinus rhythm and has been switched to oral metoprolol.    Interval History   Patient is resting comfortably. He denies any recurrence of palpitations. He states he has had no chest pain, shortness of breath, dizziness, or lightheadedness. We reviewed the plan of care as outlined below. He stated understanding and agreement.    Telemetry: NSR    Assessment:  1. Newly diagnosed atrial fibrillation with RVR  - [id number] Score 1 (age 73--pending echo).   - Echocardiogram ordered and currently pending.   - Metoprolol tartrate dose initially increase to to 50 mg BID yesterday and patient subsequently converted to sinus rhythm with some subsequent hypotension, so the dose was later decreased to 25 mg BID.     2. Prediabetes   - Hgb A1c 5.8%.    Plan:   1. Continue metoprolol tartrate 25 mg BID.   2. Awaiting an echocardiogram today. If EF is preserved, then would not anticoagulate given [id number] Score of 1. If EF reduced, would consider a Lexiscan nuclear stress test for ischemic evaluation and would be a candidate for starting anticoagulation for [id number] Score 2. Would consider apixaban 5 mg BID.  3. Patient is planning to move to [place] this coming weekend. Recommend he establish cardiology care once he gets settled there and/or in [place] where he spends his winter for continued evaluation and management.  4. Anticipate possible discharge this afternoon but final recommendations pending the echocardiogram findings.     Thank you for  the opportunity to participate in this pleasant patient's care.     ABIEL Hernandez, CNP   Nurse Practitioner  [facility]  Pager: [phone]  Text Page or securely message via [email or web address] (8am - 5pm, M-F)    Cardiology staff  I have personally examined the patient, taken his interim history, reviewed his monitor and interpreted his TTE. His lungs are clear. His heart tones regular. He feels well and is tolerating metoprolol 25 bid. LVEF is 50 to 55% normal LV chamber size, mild left atrial enlargement and mild dilation of ascending aorta. His CHADSVaSc is 1 based upon age. According to current guidelines anticoagulation not yet indicated, however, once he reaches age 74 his CHADSVaSc becomes 2. I have encouraged him to limit alcohol consumption, keep; his weight stable and to see a cardiologist in follow up once he moves to [place]. We will be happy to send any and all records and/or answer questions. Discharge to home ok with cardiology. Dr.Manoles Landrum    Patient Active Problem List   Diagnosis    Atrial fibrillation with rapid ventricular response (H)       Physical Exam   Temp: 98.2  F (36.8  C) Temp src: Oral BP: 114/72 Pulse: 60   Resp: 20 SpO2: 94 % O2 Device: None (Room air)    Vitals:    08/14/23 1229 08/14/23 1626 08/15/23 0630   Weight: 99.5 kg (219 lb 5.7 oz) 97.7 kg (215 lb 6.4 oz) 97.1 kg (214 lb 1.6 oz)     Vital Signs with Ranges  Temp:  [98.2  F (36.8  C)-98.5  F (36.9  C)] 98.2  F (36.8  C)  Pulse:  [] 60  Resp:  [8-30] 20  BP: ()/(55-96) 114/72  SpO2:  [94 %-100 %] 94 %  I/O last 3 completed shifts:  In: 150.35 [I.V.:150.35]  Out: 200 [Urine:200]    General: Appears his stated age, well nourished, and in no acute distress.  Eyes: No scleral icterus.  HEENT: Neck supple. No JVD.  Cardiovascular: Regular rate and rhythm, normal S1 and S2. No murmur, rub, or gallop.  Extremities: Moves all extremities well and symmetrically. There is no lower extremity edema  bilaterally.  Respiratory: Breathing non-labored. Lungs clear to auscultation with no crackles or wheezes bilaterally.  Skin: No pallor or cyanosis.  Psych: Appropriate affect. Mentation normal.  Neurological: No gross motor neurological focal deficits.    Medications    heparin Stopped (08/15/23 0755)    Reason anticoagulant not prescribed for atrial fibrillation      - MEDICATION INSTRUCTIONS -        metoprolol tartrate  25 mg Oral BID    sodium chloride (PF)  3 mL Intracatheter Q8H    sodium chloride (PF)  3 mL Intracatheter Q8H     Data   Recent Labs   Lab 08/15/23  0724 08/14/23  1651 08/14/23  1230   WBC 12.5* 13.8* 14.2*   HGB 14.6 16.9 15.7   MCV 95 96 97    211 216   INR  --   --  1.09   *  --  141   POTASSIUM 4.3  --  4.3   CHLORIDE 102  --  106   CO2 26  --  24   BUN 14.6  --  15.5   CR 1.10  --  1.05   ANIONGAP 7  --  11   JOEL 8.7*  --  8.8   *  --  113*   ALBUMIN  --   --  4.0   PROTTOTAL  --   --  6.6   BILITOTAL  --   --  1.2   ALKPHOS  --   --  53   ALT  --   --  16   AST  --   --  26     This note was completed in part using Dragon voice recognition software. Although reviewed after completion, some word and grammatical errors may occur.

## 2023-08-15 NOTE — PROGRESS NOTES
Cross Cover    Called for BP 82/55 Asx, HR now back in NSR, here with AF with RVR and rec'd 50 mg metop   Current tele rhythm is NSR    Requested repeat BP in 30 minutes, will drop metop 25 mg bid

## 2023-08-15 NOTE — PLAN OF CARE
13:09  Shift Summary: No c/o pain this shift.  Alert and oriented.  Ambulates with SBA.  Steady gait. No dizziness. Lungs clear. 94% RA. Tele: SR/SB.  No chest pain. Cardiac diet.  Good appetite.  Voiding without difficutly.  2 PIV saline locked. Cardiology following.  Heparin drip discontinued. Echo today.  Possible discharge home after echo.

## 2023-08-15 NOTE — DISCHARGE SUMMARY
"[facility]  Hospitalist Discharge Summary      Date of Admission:  8/14/2023  Date of Discharge:  8/15/2023  Discharging Provider: Alireza Alvarez MD  Discharge Service: Hospitalist Service    Discharge Diagnoses   Paroxysmal atrial fibrillation with RVR    PMH:  Basal cell carcinoma    Clinically Significant Risk Factors     # Obesity: Estimated body mass index is 30.72 kg/m  as calculated from the following:    Height as of this encounter: 1.778 m (5' 10\").    Weight as of this encounter: 97.1 kg (214 lb 1.6 oz).       Follow-ups Needed After Discharge   Follow-up Appointments     Follow-up and recommended labs and tests       Follow up with primary provider in 1-2 weeks.  Check blood pressure and   heart rate at that visit after starting metoprolol this admission            Unresulted Labs Ordered in the Past 30 Days of this Admission       No orders found for last 31 day(s).            Discharge Disposition   Discharged to home  Condition at discharge: Stable    Hospital Course   73 year old male w/PMH basal cell carcinoma, impaired fasting glucose who presents from home with palpitations and found to be in afib with RVR     The patient was admitted to the hospitalist service.  He spontaneously converted to sinus rhythm shortly after admission and remained in sinus during the remainder of his hospital stay.  The patient was seen by cardiology.  He was started on metoprolol which he has tolerated well.  TTE was performed showing low normal LV ftn with EF 50-55%.  Given CHADVASC2 score of 1, anticoagulation was not recommended.  He was recommended to establish care with a cardiologist after his upcoming planned relocation to [place].  He was discharged home today    Consultations This Hospital Stay   PHARMACY IP CONSULT  CARDIOLOGY IP CONSULT  PHARMACY IP CONSULT    Code Status   Full Code    Time Spent on this Encounter   I, Alireza Alvarez MD, personally saw the patient today and " spent less than or equal to 30 minutes discharging this patient.       Alireza Alvarez MD  [place] [address] [facility]  [address] [place] [address]  Phone: [phone]  Fax: [phone]  ______________________________________________________________________    Physical Exam   Vital Signs: Temp: 98.2  F (36.8  C) Temp src: Oral BP: 114/72 Pulse: 60   Resp: 20 SpO2: 94 % O2 Device: None (Room air)    Weight: 214 lbs 1.6 oz  Awake, alert, oriented  RRR  CTA bilaterally  NT/ND, soft       Primary Care Physician   Carlos Cruz    Discharge Orders      Reason for your hospital stay    Transient rapid atrial fibrillation     Follow-up and recommended labs and tests     Follow up with primary provider in 1-2 weeks.  Check blood pressure and heart rate at that visit after starting metoprolol this admission     Activity    Your activity upon discharge: activity as tolerated     Diet    Follow this diet upon discharge: regular diet       Significant Results and Procedures   Results for orders placed or performed during the hospital encounter of 23   Echocardiogram Complete     Value    LVEF  50-55%    Narrative    [id number]  [id number]  [id number]  [id number]^ELSIE^MASSIMO^A     [facility]  Echocardiography Laboratory  [address] [place] [address]     Name: CARSONCLARISSEYUMIKO  MRN: [id number]  : 1950  Study Date: 08/15/2023 01:11 PM  Age: 73 yrs  Gender: Male  Patient Location: [facility]  Reason For Study: Atrial Fibrillation  Ordering Physician: MASSIMO ZIMMERMAN  Referring Physician: Carlos Cruz  Performed By: Misti Patton RDCS     BSA: 2.1 m2  Height: 70 in  Weight: 214 lb  HR: 63  BP: 120/75 mmHg  ______________________________________________________________________________  Procedure  Complete Portable Echo Adult. [employer] (NDC #[id number]) given  intravenously.  ______________________________________________________________________________  Interpretation Summary     Technically difficult imaging  Left ventricular systolic function is low normal.  The visual ejection fraction is 50-55%.  The left ventricle is normal in size.  The left atrium is mildly dilated.  The ascending aorta is Mildly dilated.  Sinus rhythm was noted.  Doppler interrogation does not demonstrate significant stenosis or  insufficiency involving cardiac valves     No old studies for comparison  ______________________________________________________________________________  Left Ventricle  The left ventricle is normal in size. There is borderline concentric left  ventricular hypertrophy. Left ventricular systolic function is low normal. The  visual ejection fraction is 50-55%. Left ventricular diastolic function is  indeterminate. Diastolic Doppler findings (E/E' ratio and/or other parameters)  suggest left ventricular filling pressures are normal. No regional wall motion  abnormalities noted. There is no thrombus seen in the left ventricle.     Right Ventricle  The right ventricle is normal in structure, function and size. There is no  mass or thrombus in the right ventricle.     Atria  The left atrium is mildly dilated. Right atrial size is normal. There is no  atrial shunt seen. The left atrial appendage is not well visualized.     Mitral Valve  The mitral valve leaflets appear normal. There is no evidence of stenosis,  fluttering, or prolapse. There is no mitral regurgitation noted. There is no  mitral valve stenosis.     Tricuspid Valve  Normal tricuspid valve. The right ventricular systolic pressure is  approximated at 11.2 mmHg plus the right atrial pressure. Right ventricle  systolic pressure estimate normal. There is mild (1+) tricuspid regurgitation.  There is no tricuspid stenosis.     Aortic Valve  The aortic valve is trileaflet. No aortic regurgitation is present. No  aortic  stenosis is present.     Pulmonic Valve  Normal pulmonic valve. There is no pulmonic valvular regurgitation. There is  no pulmonic valvular stenosis.     Vessels  The aortic root is normal size. The ascending aorta is Mildly dilated.  Inferior vena cava not well visualized for estimation of right atrial  pressure.     Pericardium  The pericardium appears normal. There is no pleural effusion.     Rhythm  Sinus rhythm was noted.  ______________________________________________________________________________  MMode/2D Measurements & Calculations  IVSd: 1.2 cm     LVIDd: 5.4 cm  LVIDs: 3.5 cm  LVPWd: 1.1 cm  IVC diam: 2.5 cm  FS: 35.3 %  LV mass(C)d: 246.5 grams  LV mass(C)dI: 114.7 grams/m2  Ao root diam: 3.6 cm  LA dimension: 4.6 cm  asc Aorta Diam: 4.1 cm  LA/Ao: 1.3  LVOT diam: 2.5 cm  LVOT area: 4.9 cm2  LA Volume (BP): 86.2 ml  LA Volume Index (BP): 40.1 ml/m2  RV Base: 4.4 cm  RWT: 0.40     TAPSE: 1.7 cm     Doppler Measurements & Calculations  MV E max alex: 53.2 cm/sec  MV A max alex: 61.7 cm/sec  MV E/A: 0.86  MV dec slope: 181.0 cm/sec2  MV dec time: 0.29 sec  Ao V2 max: 122.0 cm/sec  Ao max P.0 mmHg  PA acc time: 0.20 sec  TR max alex: 167.0 cm/sec  TR max P.2 mmHg  E/E' av.1  Lateral E/e': 5.5  Medial E/e': 6.6  RV S Alex: 8.7 cm/sec     ______________________________________________________________________________  Report approved by: Dr. Ponce Patterson 08/15/2023 02:27 PM             Discharge Medications   Current Discharge Medication List        START taking these medications    Details   metoprolol tartrate (LOPRESSOR) 25 MG tablet Take 1 tablet (25 mg) by mouth 2 times daily  Qty: 60 tablet, Refills: 1    Associated Diagnoses: Atrial fibrillation with rapid ventricular response (H)           Allergies   No Known Allergies

## 2023-08-15 NOTE — PROGRESS NOTES
I saw and examined this patient today    Would anticipate discharge home today assuming TTE shows no significant abnormalities    Addendum:  TTE results reviewed.  Cardiology staff note reviewed.  Discharge home today

## 2023-08-15 NOTE — UTILIZATION REVIEW
"   Admission Status; Secondary Review Determination         Under the authority of the Utilization Management Committee, the utilization review process indicated a secondary review on the above patient.  The review outcome is based on review of the medical records, discussions with staff, and applying clinical experience noted on the date of the review.          (x) Observation Status Appropriate - This patient does not meet hospital inpatient criteria and is placed in observation status. If this patient's primary payer is Medicare and was admitted as an inpatient, Condition Code 44 should be used and patient status changed to \"observation\".     RATIONALE FOR DETERMINATION    73-year-old male with a history of basal cell carcinoma and impaired fasting glucose presented with palpitations since Saturday and mild dyspnea. He was evaluated at urgent care where atrial fibrillation with RVR was noted, with a heart rate in the 150s. In the ED, he was started on cardizem and heparin drips, which reverted him to NSR, resolving his symptoms. Lab results showed normal TSH and potassium levels, magnesium at 1.8, and an elevated troponin of 34, likely due to the afib and not indicative of ACS. The patient recently experienced an allergic reaction to a bee sting, resulting in hives that are now resolving. He has a mild leukocytosis, possibly from stress.   The severity of illness, intensity of service provided, expected LOS and risk for adverse outcome make the care appropriate for further observation; however, doesn't meet criteria for hospital inpatient admission. Dr Alvarez notified of this determination.    This document was produced using voice recognition software.      The information on this document is developed by the utilization review team in order for the business office to ensure compliance.  This only denotes the appropriateness of proper admission status and does not reflect the quality of care rendered.       "   The definitions of Inpatient Status and Observation Status used in making the determination above are those provided in the CMS Coverage Manual, Chapter 1 and Chapter 6, section 70.4.      Sincerely,     KAILA BERMUDEZ MD    System Medical Director  Utilization Management  [facility].

## 2024-10-05 ENCOUNTER — HEALTH MAINTENANCE LETTER (OUTPATIENT)
Age: 74
End: 2024-10-05